# Patient Record
Sex: FEMALE
[De-identification: names, ages, dates, MRNs, and addresses within clinical notes are randomized per-mention and may not be internally consistent; named-entity substitution may affect disease eponyms.]

---

## 2022-09-20 ENCOUNTER — NURSE TRIAGE (OUTPATIENT)
Dept: OTHER | Facility: CLINIC | Age: 22
End: 2022-09-20

## 2022-09-20 NOTE — TELEPHONE ENCOUNTER
Subjective: Caller states \"Having lower back pain that started a week ago and is getting worse and shooting down the left leg. \"     Current Symptoms: back pain; Onset: 1 week ago;     Associated Symptoms: NA    Pain Severity: 8/10; sharp, radiating to buttock(s) on left, hip(s) on left; constant    Temperature:   Denies Fever    What has been tried: Tylenol    LMP:  8/4/2022  Pregnant: Yes    Recommended disposition: Go to [facility]; if unable to get an appointment to go to [facility]    Care advice provided, patient verbalizes understanding; denies any other questions or concerns; instructed to call back for any new or worsening symptoms. Patient/caller agrees to proceed to nearest [facility]     This triage is a result of a call to [address] [place] [address]. Please do not respond to the triage nurse through this encounter. Any subsequent communication should be directly with the patient.         Reason for Disposition   Back pain during pregnancy   Pain radiates into the thigh or further down the leg, and in both legs    Protocols used: Back Pain-ADULT-[place], Pregnancy - Back Pain-ADULT-[place]

## 2024-01-03 ENCOUNTER — TELEPHONE (OUTPATIENT)
Dept: OBSTETRICS AND GYNECOLOGY | Facility: CLINIC | Age: 24
End: 2024-01-03